# Patient Record
Sex: FEMALE | Race: WHITE | URBAN - METROPOLITAN AREA
[De-identification: names, ages, dates, MRNs, and addresses within clinical notes are randomized per-mention and may not be internally consistent; named-entity substitution may affect disease eponyms.]

---

## 2018-05-12 ENCOUNTER — HOSPITAL ENCOUNTER (EMERGENCY)
Facility: CLINIC | Age: 23
Discharge: HOME | End: 2018-05-13
Attending: EMERGENCY MEDICINE | Admitting: EMERGENCY MEDICINE
Payer: COMMERCIAL

## 2018-05-12 DIAGNOSIS — R55 VASOVAGAL NEAR SYNCOPE: ICD-10-CM

## 2018-05-12 DIAGNOSIS — T78.40XA ALLERGIC REACTION, INITIAL ENCOUNTER: ICD-10-CM

## 2018-05-12 PROCEDURE — 99284 EMERGENCY DEPT VISIT MOD MDM: CPT | Mod: 25 | Performed by: EMERGENCY MEDICINE

## 2018-05-12 PROCEDURE — 99283 EMERGENCY DEPT VISIT LOW MDM: CPT | Performed by: EMERGENCY MEDICINE

## 2018-05-12 PROCEDURE — 93005 ELECTROCARDIOGRAM TRACING: CPT | Performed by: EMERGENCY MEDICINE

## 2018-05-12 PROCEDURE — 93010 ELECTROCARDIOGRAM REPORT: CPT | Mod: Z6 | Performed by: EMERGENCY MEDICINE

## 2018-05-12 RX ORDER — DIPHENHYDRAMINE HCL 25 MG
25 CAPSULE ORAL ONCE
Status: COMPLETED | OUTPATIENT
Start: 2018-05-12 | End: 2018-05-13

## 2018-05-12 RX ORDER — ALBUTEROL SULFATE 0.83 MG/ML
1 SOLUTION RESPIRATORY (INHALATION) EVERY 6 HOURS PRN
COMMUNITY
End: 2018-05-21

## 2018-05-12 ASSESSMENT — ENCOUNTER SYMPTOMS
FACIAL SWELLING: 1
TROUBLE SWALLOWING: 1
SHORTNESS OF BREATH: 0
VOMITING: 0
ABDOMINAL PAIN: 1
FEVER: 0
DYSURIA: 0
DIZZINESS: 1
NAUSEA: 0
COUGH: 0
LIGHT-HEADEDNESS: 1
DIARRHEA: 0
HEMATURIA: 0

## 2018-05-12 NOTE — ED AVS SNAPSHOT
[facility], [place], Emergency Department    [address] [place] [address]    Phone:  [phone]                                       Seema Henao   MRN: [id number]    Department:  [facility], Emergency Department   Date of Visit:  5/12/2018           After Visit Summary Signature Page     I have received my discharge instructions, and my questions have been answered. I have discussed any challenges I see with this plan with the nurse or doctor.    ..........................................................................................................................................  Patient/Patient Representative Signature      ..........................................................................................................................................  Patient Representative Print Name and Relationship to Patient    ..................................................               ................................................  Date                                            Time    ..........................................................................................................................................  Reviewed by Signature/Title    ...................................................              ..............................................  Date                                                            Time

## 2018-05-12 NOTE — ED AVS SNAPSHOT
[facility], Emergency Department    [address] [address]    Phone:  [phone]                                       Seema Henao   MRN: [id number]    Department:  [facility], Emergency Department   Date of Visit:  5/12/2018           Patient Information     Date Of Birth          1995        Your diagnoses for this visit were:     Vasovagal syncope     Allergic reaction, initial encounter        You were seen by Yolanda Mendez MD.        Discharge Instructions       Thank you for coming to the [facility] Emergency Department.     Use benadryl 25-50mg every 6 hours as needed for itching, hives or swelling.     Return to the ER immediately for trouble breathing, swelling of the tongue or throat.     Please follow up with your primary care provider with any ongoing concerns. Referral to an allergist if any additional allergic reaction symptoms occur.         24 Hour Appointment Hotline       To make an appointment at any [facility] clinic, call [phone]). If you don't have a family doctor or clinic, we will help you find one. [facility] clinics are conveniently located to serve the needs of you and your family.             Review of your medicines      START taking        Dose / Directions Last dose taken    diphenhydrAMINE 25 MG capsule   Commonly known as:  BENADRYL   Dose:  25 mg   Quantity:  20 capsule        Take 1 capsule (25 mg) by mouth every 6 hours as needed for itching or allergies   Refills:  0          Our records show that you are taking the medicines listed below. If these are incorrect, please call your family doctor or clinic.        Dose / Directions Last dose taken    albuterol (2.5 MG/3ML) 0.083% neb solution   Dose:  1 vial        Take 1 vial by nebulization every 6 hours as needed for shortness of breath / dyspnea or wheezing   Refills:  0        SERTRALINE HCL PO        Take by mouth daily   Refills:  0                 Prescriptions were sent or printed at these locations (1 Prescription)                   Other Prescriptions                Printed at Department/Unit printer (1 of 1)         diphenhydrAMINE (BENADRYL) 25 MG capsule                Procedures and tests performed during your visit     EKG 12 lead      Orders Needing Specimen Collection     None      Pending Results     Date and Time Order Name Status Description    5/12/2018 2342 EKG 12 lead Preliminary             Pending Culture Results     No orders found for last 3 day(s).            Pending Results Instructions     If you had any lab results that were not finalized at the time of your Discharge, you can call the ED Lab Result RN at [phone]. You will be contacted by this team for any positive Lab results or changes in treatment. The nurses are available 7 days a week from 10A to 6:30P.  You can leave a message 24 hours per day and they will return your call.        Thank you for choosing [facility]       Thank you for choosing [facility] for your care. Our goal is always to provide you with excellent care. Hearing back from our patients is one way we can continue to improve our services. Please take a few minutes to complete the written survey that you may receive in the mail after you visit with us. Thank you!        Care EveryWhere ID     This is your Care EveryWhere ID. This could be used by other organizations to access your [facility] medical records  [id number]        Equal Access to Services     MONIKA MANJARREZ : Adriano Rizzo, waomerda christiano, qaybta kaalmada catracho, jazmín guerrero. So [facility] [phone].    ATENCIÓN: Si habla español, tiene a bolton disposición servicios gratuitos de asistencia lingüística. Llame al [phone].    We comply with applicable federal civil rights laws and [place] laws. We do not discriminate on the basis of race, color, national origin, age, disability, sex, sexual  orientation, or gender identity.            After Visit Summary       This is your record. Keep this with you and show to your community pharmacist(s) and doctor(s) at your next visit.

## 2018-05-13 VITALS
SYSTOLIC BLOOD PRESSURE: 103 MMHG | OXYGEN SATURATION: 100 % | DIASTOLIC BLOOD PRESSURE: 73 MMHG | TEMPERATURE: 97.9 F | HEART RATE: 75 BPM

## 2018-05-13 PROCEDURE — 25000132 ZZH RX MED GY IP 250 OP 250 PS 637: Performed by: EMERGENCY MEDICINE

## 2018-05-13 RX ORDER — DIPHENHYDRAMINE HCL 25 MG
25 CAPSULE ORAL EVERY 6 HOURS PRN
Qty: 20 CAPSULE | Refills: 0 | Status: SHIPPED | OUTPATIENT
Start: 2018-05-13 | End: 2018-05-21

## 2018-05-13 RX ADMIN — DIPHENHYDRAMINE HYDROCHLORIDE 25 MG: 25 CAPSULE ORAL at 00:24

## 2018-05-13 RX ADMIN — RANITIDINE 150 MG: 150 TABLET ORAL at 00:41

## 2018-05-13 NOTE — DISCHARGE INSTRUCTIONS
Thank you for coming to the [facility] Emergency Department.     Use benadryl 25-50mg every 6 hours as needed for itching, hives or swelling.     Return to the ER immediately for trouble breathing, swelling of the tongue or throat.     Please follow up with your primary care provider with any ongoing concerns. Referral to an allergist if any additional allergic reaction symptoms occur.

## 2018-05-13 NOTE — ED PROVIDER NOTES
History     Chief Complaint   Patient presents with     Allergic Reaction     HPI  Seema Henao is an otherwise healthy 23 year old female with history of asthma who presents to the emergency department for the evaluation of a potential allergic reaction. The patient reports that she was at a tasting event this evening when she developed intraoral swelling as well as lip swelling/tingling. The patient then attempted to leave the restaurant and obtain Benadryl, however, she became lightheaded and diaphoretic with associated heart burn when attempting to do so, and EMS was called.  The patient reports that all this happened at around 2210, but 1.5 hours prior to arrival.  She states that she does have a Orient allergy, but it has never provoked symptoms as severe as tonight.  She denies any other known allergies. She was not able to take Benadryl prior to arrival. The patient was furnished with a list of ingredients used within the food present at the tasting tonight, and she believes that her symptoms were secondary to whitefish consumption as she does not typically ingest fish.  Currently, the patient reports that her symptoms have largely resolved.  She denies any rash or other medical problems.  She reports that she had seen an allergist when she was younger, however, she has not seen one recently      PAST MEDICAL HISTORY: healthy   PAST SURGICAL HISTORY: No past surgical history on file.    FAMILY HISTORY: No family history on file.    SOCIAL HISTORY:   No tobacco or alcohol use       Allergies   Allergen Reactions     Walnuts [Nuts]          I have reviewed the Medications, Allergies, Past Medical and Surgical History, and Social History in the Epic system.    Review of Systems   Constitutional: Negative for fever.   HENT: Positive for facial swelling (lower lip ) and trouble swallowing (2/2 swelling). Negative for congestion and drooling.    Respiratory: Negative for cough and shortness of breath.     Cardiovascular: Negative for chest pain.   Gastrointestinal: Positive for abdominal pain (Heartburn). Negative for diarrhea, nausea and vomiting.   Genitourinary: Negative for dysuria and hematuria.   Neurological: Positive for dizziness and light-headedness.   All other systems reviewed and are negative.      Physical Exam   BP: 109/78  Pulse: 72  Temp: 97.9  F (36.6  C)  SpO2: 99 %      Physical Exam   Gen:A&Ox3, no acute distress, normal speech  HEENT:PERRL, no facial tenderness or wounds, head atraumatic, oropharynx clear, mucous membranes moist, TMs clear bilaterally. No edema to lips, tongue, soft palate.   Neck:no bony tenderness or step offs, no JVD, trachea midline, no swelling  Back: no CVA tenderness, no midline bony tenderness  CV:RRR without murmurs  PULM:Clear to auscultation bilaterally  Abd:soft, nontender, nondistended. Bowel sounds present and normal  UE:No traumatic injuries, skin normal  LE:no traumatic injuries, skin normal, no LE edema.   Neuro:CN II-XII intact, strength 5/5 throughout, gait stable.   Skin: no rashes or ecchymoses      ED Course     ED Course     Procedures             EKG Interpretation:      Interpreted by Yolanda Mendez  Time reviewed: 11:50PM  Symptoms at time of EKG: near syncope  Rhythm: normal sinus   Rate: 74  Axis: normal  Ectopy: none  Conduction: normal  ST Segments/ T Waves: No ST-T wave changes  Q Waves: none  Comparison to prior: No old EKG available    Clinical Impression: normal EKG          Critical Care time:  none    Assessments & Plan (with Medical Decision Making)   24 yo F presenting with possible allergic reaction.  Symptoms now improved.   Associated with lightheadedness, with description suspicious for vasovagal reaction.   EKG unremarkable.   Monitored on tele and no arrhythmias noted.   Treated with 15mg PO benadryl and 150mg PO ranitidine.   Monitored in the ED for 2 hours and exam repeated 3 times. Complete resolution of lip swelling, no soft  palate swelling, or other signs of anaphylaxis.   Benadryl PRN.   Follow up with primary care and allergist as needed.   Return instructions reviewed.     I have reviewed the nursing notes.    I have reviewed the findings, diagnosis, plan and need for follow up with the patient.    Discharge Medication List as of 5/13/2018 12:52 AM      START taking these medications    Details   diphenhydrAMINE (BENADRYL) 25 MG capsule Take 1 capsule (25 mg) by mouth every 6 hours as needed for itching or allergies, Disp-20 capsule, R-0, Local Print             Final diagnoses:   Vasovagal syncope   Allergic reaction, initial encounter   I, Bandar Linder, am serving as a trained medical scribe to document services personally performed by Yolanda Mendez MD, based on the provider's statements to me.      I, Yolanda Mendez MD, was physically present and have reviewed and verified the accuracy of this note documented by Bandar Linder.       5/12/2018   [facility], [place], EMERGENCY DEPARTMENT    MD ZACKERY Hadley Katrina Anne, MD  05/13/18 0123

## 2018-05-13 NOTE — ED TRIAGE NOTES
Pt was at a restaurant and she started feeling like her throat was closing, her tongue was swelling, and she felt clammy and faint. She feels a lot better now. She still feels shaky. Still some burning in her stomach, feels like acid reflux. She has a condition where she feels like she is going to faint when she gets an IV. No medicine from EMS besides oxygen. bp 99/63, 62 heart rate, 985 on RA

## 2018-05-14 LAB — INTERPRETATION ECG - MUSE: NORMAL

## 2018-05-21 ENCOUNTER — OFFICE VISIT (OUTPATIENT)
Dept: INTERNAL MEDICINE | Facility: CLINIC | Age: 23
End: 2018-05-21
Payer: COMMERCIAL

## 2018-05-21 VITALS
SYSTOLIC BLOOD PRESSURE: 108 MMHG | OXYGEN SATURATION: 98 % | HEART RATE: 73 BPM | WEIGHT: 145.1 LBS | DIASTOLIC BLOOD PRESSURE: 69 MMHG

## 2018-05-21 DIAGNOSIS — T78.40XD ALLERGIC REACTION, SUBSEQUENT ENCOUNTER: ICD-10-CM

## 2018-05-21 DIAGNOSIS — T78.40XD ALLERGIC REACTION, SUBSEQUENT ENCOUNTER: Primary | ICD-10-CM

## 2018-05-21 LAB
BASOPHILS # BLD AUTO: 0.1 10E9/L (ref 0–0.2)
BASOPHILS NFR BLD AUTO: 0.5 %
DIFFERENTIAL METHOD BLD: NORMAL
EOSINOPHIL # BLD AUTO: 0.3 10E9/L (ref 0–0.7)
EOSINOPHIL NFR BLD AUTO: 2.8 %
ERYTHROCYTE [DISTWIDTH] IN BLOOD BY AUTOMATED COUNT: 12.7 % (ref 10–15)
HCT VFR BLD AUTO: 42.1 % (ref 35–47)
HGB BLD-MCNC: 14.1 G/DL (ref 11.7–15.7)
IMM GRANULOCYTES # BLD: 0 10E9/L (ref 0–0.4)
IMM GRANULOCYTES NFR BLD: 0.3 %
LYMPHOCYTES # BLD AUTO: 3.3 10E9/L (ref 0.8–5.3)
LYMPHOCYTES NFR BLD AUTO: 35 %
MCH RBC QN AUTO: 29.8 PG (ref 26.5–33)
MCHC RBC AUTO-ENTMCNC: 33.5 G/DL (ref 31.5–36.5)
MCV RBC AUTO: 89 FL (ref 78–100)
MONOCYTES # BLD AUTO: 0.7 10E9/L (ref 0–1.3)
MONOCYTES NFR BLD AUTO: 7.3 %
NEUTROPHILS # BLD AUTO: 5.1 10E9/L (ref 1.6–8.3)
NEUTROPHILS NFR BLD AUTO: 54.1 %
NRBC # BLD AUTO: 0 10*3/UL
NRBC BLD AUTO-RTO: 0 /100
PLATELET # BLD AUTO: 263 10E9/L (ref 150–450)
PLATELET # BLD EST: NORMAL 10*3/UL
RBC # BLD AUTO: 4.73 10E12/L (ref 3.8–5.2)
WBC # BLD AUTO: 9.4 10E9/L (ref 4–11)

## 2018-05-21 RX ORDER — EPINEPHRINE 0.3 MG/.3ML
INJECTION SUBCUTANEOUS
Qty: 0.6 ML | Refills: 1 | Status: SHIPPED | OUTPATIENT
Start: 2018-05-21

## 2018-05-21 ASSESSMENT — PAIN SCALES - GENERAL: PAINLEVEL: NO PAIN (0)

## 2018-05-21 NOTE — PROGRESS NOTES
[facility]  Primary Care Center   Lynn Huang MD  05/21/2018      Chief Complaint:   No chief complaint on file.       History of Present Illness:   Seema Henao is a 23 year old female with a history of *** who presents for evaluation of ***.    Other concerns discussed:  ***     Review of Systems:   Pertinent items are noted in HPI, remainder of complete ROS is negative.      Active Medications:      albuterol (2.5 MG/3ML) 0.083% neb solution, Take 1 vial by nebulization every 6 hours as needed for shortness of breath / dyspnea or wheezing, Disp: , Rfl:      diphenhydrAMINE (BENADRYL) 25 MG capsule, Take 1 capsule (25 mg) by mouth every 6 hours as needed for itching or allergies, Disp: 20 capsule, Rfl: 0     SERTRALINE HCL PO, Take by mouth daily, Disp: , Rfl:       Allergies:   Walnuts [nuts]      Past Medical History:  Food allergy  Asthma      Past Surgical History:  History reviewed. No pertinent past surgical history.     Family History:   History reviewed. No pertinent family history.       Social History:   Marital Status: single  Tobacco Use: No previous or current tobacco use.  Alcohol Use: No alcohol use.   PCP: [facility]      Physical Exam:   There were no vitals taken for this visit.   ***      Assessment and Plan:  There are no diagnoses linked to this encounter.        Follow-up: Data Unavailable         Scribe Disclosure:   I, Nina Coughlin, am serving as a scribe to document services personally performed by Lynn Huang MD at this visit, based upon the provider's statements to me. All documentation has been reviewed by the aforementioned provider prior to being entered into the official medical record.     Portions of this medical record were completed by a scribe. UPON MY REVIEW AND AUTHENTICATION BY ELECTRONIC SIGNATURE, this confirms (a) I performed the applicable clinical services, and (b) the record is accurate.

## 2018-05-21 NOTE — PATIENT INSTRUCTIONS
[facility]: [phone]     [facility] Medication Refill Request Information:  * Please contact your pharmacy regarding ANY request for medication refills.  ** [facility] Prescription Fax = [phone]  * Please allow 3 business days for routine medication refills.  * Please allow 5 business days for controlled substance medication refills.     [facility] Test Result notification information:  *You will be notified with in 7-10 days of your appointment day regarding the results of your test.  If you are on MyChart you will be notified as soon as the provider has reviewed the results and signed off on them.

## 2018-05-21 NOTE — NURSING NOTE
Chief Complaint   Patient presents with     Allergies     Pt is here to discuss food allergy.     Talya Toscano LPN at 3:59 PM on 5/21/2018.

## 2018-05-21 NOTE — PROGRESS NOTES
[facility]  Primary Care Center   Lynn Huang MD  05/21/2018      Chief Complaint:   Allergies       History of Present Illness:   Seema Henao is a 23 year old female with a history of asthma and seasonal allergies who presents for evaluation of food allergy. We discussed the following concerns:    On 5/12/18, the patient was at a food tasting. She has an allergy to walnuts, which results in a mild itching in the mouth. She does not have an epi pen. She ate multiple samples of dishes, but believes the white fish dish was the culprit as the reaction occurred shortly after trying it and she does not eat white fish but regularly eats the ingredients in all the other dishes. A full list of ingredients was provided by the [facility] and there were no walnuts used and no walnuts in the kitchen. She has eaten tuna, lobster, and crab recently without issue. In general, she does not enjoy seafood and does not eat it often.     The allergic reaction was lip/throat/mouth burning and swelling/closing of her throat and swelling of the lower lip. She did have difficulty breathing and when she stood up from the chair, she became faint/lightheaded/clammy and saw dots but did not lose consciousness. EMS was called. She was told that she had low BP in the ambulance. Her symptoms improved while in the ambulance without any therapies. Received PO Benadryl and Zantac (had some burning in the epigastrium) in the ED. Had some diarrhea but no abdominal pain, hives, or rash.    Saw an allergist in the past as a child for prick testing, which showed seasonal allergies.    Just graduated from the  with majors in marketing and [employer]. She follows with a nurse practitioner at [facility] that specializes in mental health and women's health. She is up to date on her pap smear, HPV shot, and tetanus shot.      Review of Systems:   Pertinent positives and negatives are noted in HPI.      Active Medications:     Current  Outpatient Prescriptions:      Levalbuterol HCl (XOPENEX IN), Inhale into the lungs as needed, Disp: , Rfl:      SERTRALINE HCL PO, Take 75 mg by mouth daily , Disp: , Rfl:       Allergies:   Walnuts [nuts]      Past Medical History:  No past medical history on file.  There is no problem list on file for this patient.       Past Surgical History:  No past surgical history on file.    Family History:   No family history on file.      Social History:   Social History   Substance Use Topics     Smoking status: Not on file     Smokeless tobacco: Not on file     Alcohol use Not on file        Physical Exam:   /69  Pulse 73  Wt 65.8 kg (145 lb 1.6 oz)  SpO2 98%   Constitutional: no distress, comfortable, pleasant   Head: no signs of trauma  Eyes: anicteric, pupils equal, normal extra-ocular movements  Cardiovascular: regular rate and rhythm, normal S1 and S2, no murmurs, rubs or gallops  Respiratory: clear to auscultation, no wheezes or crackles, normal breath sounds   Gastrointestinal: nontender, no hepatosplenomegaly, no masses   Musculoskeletal: moves all extremities, no deformities noted  Skin: no concerning lesions, no jaundice   Neurological: normal speech, normal gait, no tremor   Psychological: appropriate mood and affect    Assessment and Plan:  Allergic reaction, subsequent encounter  Patient referred to allergy for further testing and diagnosis. Epi pen provided for use in emergency. Can also get Benadryl OTC. Will obtain CBC to assess for eosinophilia.   - ALLERGY/ASTHMA ADULT REFERRAL  - EPINEPHrine (EPIPEN/ADRENACLICK/OR ANY BX GENERIC EQUIV) 0.3 MG/0.3ML injection 2-pack  Dispense: 0.6 mL; Refill: 1  - CBC with platelets differential       Follow-up: As needed, patient receives ethan primary care at [facility].         Monique Sidhu, MS4, serving as a scribe for Lynn Huang MD    The medical student acted as scribe and the encounter documented above was completely performed by myself.  Supervising  Doctor Lynn Huang MD

## 2018-05-21 NOTE — MR AVS SNAPSHOT
After Visit Summary   5/21/2018    Seema Henao    MRN: [id number]           Patient Information     Date Of Birth          1995        Visit Information        Provider Department      5/21/2018 4:10 PM Lynn Huang MD [facility]        Today's Diagnoses     Allergic reaction, subsequent encounter    -  1      Care Instructions    [facility]: [phone]     [facility] Medication Refill Request Information:  * Please contact your pharmacy regarding ANY request for medication refills.  ** [facility] Prescription Fax = [phone]  * Please allow 3 business days for routine medication refills.  * Please allow 5 business days for controlled substance medication refills.     Primary Care Center Test Result notification information:  *You will be notified with in 7-10 days of your appointment day regarding the results of your test.  If you are on MyChart you will be notified as soon as the provider has reviewed the results and signed off on them.            Follow-ups after your visit        Additional Services     ALLERGY/ASTHMA ADULT REFERRAL       Your provider has referred you to: [facility] - [phone]  [email or web address]    Please be aware that coverage of these services is subject to the terms and limitations of your health insurance plan.  Call member services at your health plan with any benefit or coverage questions.      Please bring the following with you to your appointment:    (1) Any X-Rays, CTs or MRIs which have been performed.  Contact the facility where they were done to arrange for  prior to your scheduled appointment.    (2) List of current medications  (3) This referral request   (4) Any documents/labs given to you for this referral                  Your next 10 appointments already scheduled     Aug 27, 2018  2:15 PM CDT   (Arrive by 2:00 PM)   New Allergy  with Michael Fields MD   [facility] ([facility])    [address]  [address] [place] [id number] [phone]           Do not take anti-histamines or Zantac for seven day prior to your appointment.              Future tests that were ordered for you today     Open Future Orders        Priority Expected Expires Ordered    CBC with platelets differential Routine 5/21/2018 6/4/2018 5/21/2018            Who to contact     Please call your clinic at [phone] to:    Ask questions about your health    Make or cancel appointments    Discuss your medicines    Learn about your test results    Speak to your doctor            Additional Information About Your Visit        Care EveryWhere ID     This is your Care EveryWhere ID. This could be used by other organizations to access your [facility] medical records  [id number]        Your Vitals Were     Pulse Pulse Oximetry                73 98%           Blood Pressure from Last 3 Encounters:   05/21/18 108/69   05/13/18 103/73    Weight from Last 3 Encounters:   05/21/18 65.8 kg (145 lb 1.6 oz)              We Performed the Following     ALLERGY/ASTHMA ADULT REFERRAL          Today's Medication Changes          These changes are accurate as of 5/21/18  5:27 PM.  If you have any questions, ask your nurse or doctor.               Start taking these medicines.        Dose/Directions    EPINEPHrine 0.3 MG/0.3ML injection 2-pack   Commonly known as:  EPIPEN/ADRENACLICK/or ANY BX GENERIC EQUIV   Used for:  Allergic reaction, subsequent encounter        Use 1 epi pen for lip swelling, difficulty breathing, or wheezing.   Quantity:  0.6 mL   Refills:  1         Stop taking these medicines if you haven't already. Please contact your care team if you have questions.     albuterol (2.5 MG/3ML) 0.083% neb solution           diphenhydrAMINE 25 MG capsule   Commonly known as:  BENADRYL                Where  to get your medicines      These medications were sent to [place], [place] - [address]  [address], [facility] [address]    Hours:  TRANSPLANT PHONE NUMBER [phone] Phone:  [phone]     EPINEPHrine 0.3 MG/0.3ML injection 2-pack                Primary Care Provider Fax #    [facility] [phone]       [address] [place] [address]        Equal Access to Services     MONIKA MANJARREZ : Hadii aad ku hadasho Soomaali, waaxda luqadaha, qaybta kaalmada adeegyada, waxay idiin hayaan adeeg kharash la'mckenzie guerrero. [facility] [phone].    ATENCIÓN: Si habla español, tiene a bolton disposición servicios gratuitos de asistencia lingüística. [facility] [phone].    We comply with applicable federal civil rights laws and [place] laws. We do not discriminate on the basis of race, color, national origin, age, disability, sex, sexual orientation, or gender identity.            Thank you!     Thank you for choosing [facility]  for your care. Our goal is always to provide you with excellent care. Hearing back from our patients is one way we can continue to improve our services. Please take a few minutes to complete the written survey that you may receive in the mail after your visit with us. Thank you!             Your Updated Medication List - Protect others around you: Learn how to safely use, store and throw away your medicines at www.disposemymeds.org.          This list is accurate as of 5/21/18  5:27 PM.  Always use your most recent med list.                   Brand Name Dispense Instructions for use Diagnosis    EPINEPHrine 0.3 MG/0.3ML injection 2-pack    EPIPEN/ADRENACLICK/or ANY BX GENERIC EQUIV    0.6 mL    Use 1 epi pen for lip swelling, difficulty breathing, or wheezing.    Allergic reaction, subsequent encounter       SERTRALINE HCL PO      Take 75 mg by mouth daily        XOPENEX IN      Inhale into the  lungs as needed

## 2018-08-10 NOTE — TELEPHONE ENCOUNTER
FUTURE VISIT INFORMATION      FUTURE VISIT INFORMATION:    Date: 8.27.18    Time: 2:15 PM    [facility]  REFERRAL INFORMATION:    Referring provider:  Dr. Pearson    Referring providers clinic:  [facility]    Reason for visit/diagnosis  Allergic reaction    RECORDS REQUESTED FROM:       Clinic name Comments Records Status Imaging Status   PCC Referral placed 5.21.18 EPIC                                    RECORDS STATUS      RECORDS RECEIVED FROM: [facility] and [facility]   DATE RECEIVED: 8.27.18   NOTES STATUS DETAILS   OFFICE NOTE from referring provider Internal 5.21.18 Referral and OV, PCC   OFFICE NOTE from other specialist N/A    DISCHARGE SUMMARY from hospital N/A    DISCHARGE REPORT from the ER Internal 5.12.18 [facility]   OPERATIVE REPORT N/A    MEDICATION LIST Internal    IMAGING  (NEED IMAGES AND REPORTS)     CT SCAN N/A    CHEST XRAY (CXR) N/A    TESTS     PULMONARY FUNCTION TESTING (PFT) N/A    FLOW LOOP VOLUME (FVL) N/A    CYSTIC FIBROSIS     CF SPUTUM CULTURE N/A

## 2018-08-27 ENCOUNTER — PRE VISIT (OUTPATIENT)
Dept: PULMONOLOGY | Facility: CLINIC | Age: 23
End: 2018-08-27

## 2018-08-27 ENCOUNTER — OFFICE VISIT (OUTPATIENT)
Dept: PULMONOLOGY | Facility: CLINIC | Age: 23
End: 2018-08-27
Attending: ALLERGY & IMMUNOLOGY
Payer: COMMERCIAL

## 2018-08-27 VITALS
SYSTOLIC BLOOD PRESSURE: 104 MMHG | BODY MASS INDEX: 22.5 KG/M2 | HEART RATE: 85 BPM | DIASTOLIC BLOOD PRESSURE: 70 MMHG | RESPIRATION RATE: 16 BRPM | HEIGHT: 66 IN | WEIGHT: 140 LBS | OXYGEN SATURATION: 98 %

## 2018-08-27 DIAGNOSIS — T78.40XD ALLERGIC REACTION, SUBSEQUENT ENCOUNTER: ICD-10-CM

## 2018-08-27 DIAGNOSIS — J30.1 CHRONIC SEASONAL ALLERGIC RHINITIS DUE TO POLLEN: ICD-10-CM

## 2018-08-27 DIAGNOSIS — Z91.013 SEAFOOD ALLERGY: Primary | ICD-10-CM

## 2018-08-27 PROCEDURE — G0463 HOSPITAL OUTPT CLINIC VISIT: HCPCS | Mod: 25,ZF

## 2018-08-27 PROCEDURE — 95004 PERQ TESTS W/ALRGNC XTRCS: CPT | Performed by: ALLERGY & IMMUNOLOGY

## 2018-08-27 PROCEDURE — G0463 HOSPITAL OUTPT CLINIC VISIT: HCPCS

## 2018-08-27 ASSESSMENT — PAIN SCALES - GENERAL: PAINLEVEL: NO PAIN (0)

## 2018-08-27 NOTE — PROGRESS NOTES
Reason for Visit  Seema Henao is a 23 year old female who is referred by Service, [facility] for allergic reaction    Allergy HPI    This is the note from PCP: 'On 5/12/18, the patient was at a food tasting. She has an allergy to walnuts, which results in a mild itching in the mouth. She does not have an epi pen. She ate multiple samples of dishes, but believes the white fish dish was the culprit as the reaction occurred shortly after trying it and she does not eat white fish but regularly eats the ingredients in all the other dishes. A full list of ingredients was provided by the [facility] and there were no walnuts used and no walnuts in the kitchen. She has eaten tuna, lobster, and crab recently without issue. In general, she does not enjoy seafood and does not eat it often.      The allergic reaction was lip/throat/mouth burning and swelling/closing of her throat and swelling of the lower lip. She did have difficulty breathing and when she stood up from the chair, she became faint/lightheaded/clammy and saw dots but did not lose consciousness. EMS was called. She was told that she had low BP in the ambulance. Her symptoms improved while in the ambulance without any therapies. Received PO Benadryl and Zantac (had some burning in the epigastrium) in the ED. Had some diarrhea but no abdominal pain, hives, or rash.'    No reactions since then.  Other nuts are fine, apples cherries carrots never cause problems.    Menu had kwi, kohlrabi, cucumber, chicken liver, lamb, beets, asparagus, celeriac, quail, whitefish, duck.  Also drank some wine with this.   She was otherwise feeling well.  No aspirin or other new meds at the time.     Spring and fall she gets runny nose, red watery eyes and occasional sinus infection an uses nasocort and no allergy pills.  nasocort keeps things under control.  She has asthma and uses Xopenex prn and mostly before exercise.  No frequent need otherwise.    Social:  No pets, no  "smoking    Family hx:  Mom with seasonal allergies.         The patient was seen and examined by Michael Keys MD   Current Outpatient Prescriptions   Medication     EPINEPHrine (EPIPEN/ADRENACLICK/OR ANY BX GENERIC EQUIV) 0.3 MG/0.3ML injection 2-pack     Levalbuterol HCl (XOPENEX IN)     SERTRALINE HCL PO     No current facility-administered medications for this visit.      Allergies   Allergen Reactions     Prednisone      hallucinations     Walnuts [Nuts]      Social History     Social History     Marital status: Single     Spouse name: N/A     Number of children: N/A     Years of education: N/A     Occupational History     Not on file.     Social History Main Topics     Smoking status: Never Smoker     Smokeless tobacco: Never Used     Alcohol use Yes      Comment: couple/week     Drug use: No     Sexual activity: Not on file     Other Topics Concern     Not on file     Social History Narrative     No narrative on file     History reviewed. No pertinent past medical history.  History reviewed. No pertinent surgical history.  History reviewed. No pertinent family history.      ROS   A complete ROS was otherwise negative except as noted in the HPI and the end of the note.  /70  Pulse 85  Resp 16  Ht 1.676 m (5' 6\")  Wt 63.5 kg (140 lb)  SpO2 98%  BMI 22.6 kg/m2  Exam:   GENERAL APPEARANCE: Well developed, well nourished, alert, and in no apparent distress.  EYES: PERRL, EOMI, conjunctiva clear non-injected  HENT: Nasal mucosa with moderate edema on the left and scant clear discharge. No nasal polyps.    EARS: Canals clear, TMs normal  MOUTH: Oral mucosa is moist, without any lesions, no tonsillar enlargement, no oropharyngeal exudate.  RESP: Good air flow throughout.  No crackles. No rhonchi. No wheezes.  CV: Normal S1, S2, regular rhythm, normal rate. No murmur.  No rub. No gallop. No LE edema.   MS: Extremities normal. No clubbing. No cyanosis.  SKIN: No rashes noted  NEURO: Speech " normal, normal strength and tone, normal gait and stance  PSYCH: Normal mentation, orientation to person, place, and time.  Results: 11 percutaneous food and environmental allergen (and 2 controls) extracts placed by MA and read by MD.  Catfish, cod and perch all positive.  Hazelnut mild positive, grass, mugwort weed and birch tree pollen positive.          Assessment and plan: I did look over the extensive menu and ingredients of the tasting menu she ate.  IgE mediated skin testing shows she is positive to finned fish and needs to avoid. Oyster is negative so mollusks should be OK, she also tolerates shellfish.  She has an epi device.  We also accessed to pollens and things that cross react in oral allergy syndrome.  She did show a very mild positive to hazelnut and this is most likely oral allergy.

## 2018-08-27 NOTE — LETTER
8/27/2018       RE: Seema Henao  [address]  [facility] [address]     Dear Colleague,    Thank you for referring your patient, Seema Henao, to the [facility] at [facility]. Please see a copy of my visit note below.    Reason for Visit  Seema Henao is a 23 year old female who is referred by Service, [facility] for allergic reaction    Allergy HPI    This is the note from PCP: 'On 5/12/18, the patient was at a food tasting. She has an allergy to walnuts, which results in a mild itching in the mouth. She does not have an epi pen. She ate multiple samples of dishes, but believes the white fish dish was the culprit as the reaction occurred shortly after trying it and she does not eat white fish but regularly eats the ingredients in all the other dishes. A full list of ingredients was provided by the [facility] and there were no walnuts used and no walnuts in the kitchen. She has eaten tuna, lobster, and crab recently without issue. In general, she does not enjoy seafood and does not eat it often.      The allergic reaction was lip/throat/mouth burning and swelling/closing of her throat and swelling of the lower lip. She did have difficulty breathing and when she stood up from the chair, she became faint/lightheaded/clammy and saw dots but did not lose consciousness. EMS was called. She was told that she had low BP in the ambulance. Her symptoms improved while in the ambulance without any therapies. Received PO Benadryl and Zantac (had some burning in the epigastrium) in the ED. Had some diarrhea but no abdominal pain, hives, or rash.'    No reactions since then.  Other nuts are fine, apples cherries carrots never cause problems.    Menu had kwi, kohlrabi, cucumber, chicken liver, lamb, beets, asparagus, celeriac, quail, whitefish, duck.  Also drank some wine with this.   She was otherwise feeling well.  No aspirin or other  "new meds at the time.     Spring and fall she gets runny nose, red watery eyes and occasional sinus infection an uses nasocort and no allergy pills.  nasocort keeps things under control.  She has asthma and uses Xopenex prn and mostly before exercise.  No frequent need otherwise.    Social:  No pets, no smoking    Family hx:  Mom with seasonal allergies.         The patient was seen and examined by Michael Keys MD   Current Outpatient Prescriptions   Medication     EPINEPHrine (EPIPEN/ADRENACLICK/OR ANY BX GENERIC EQUIV) 0.3 MG/0.3ML injection 2-pack     Levalbuterol HCl (XOPENEX IN)     SERTRALINE HCL PO     No current facility-administered medications for this visit.      Allergies   Allergen Reactions     Prednisone      hallucinations     Walnuts [Nuts]      Social History     Social History     Marital status: Single     Spouse name: N/A     Number of children: N/A     Years of education: N/A     Occupational History     Not on file.     Social History Main Topics     Smoking status: Never Smoker     Smokeless tobacco: Never Used     Alcohol use Yes      Comment: couple/week     Drug use: No     Sexual activity: Not on file     Other Topics Concern     Not on file     Social History Narrative     No narrative on file     History reviewed. No pertinent past medical history.  History reviewed. No pertinent surgical history.  History reviewed. No pertinent family history.      ROS   A complete ROS was otherwise negative except as noted in the HPI and the end of the note.  /70  Pulse 85  Resp 16  Ht 1.676 m (5' 6\")  Wt 63.5 kg (140 lb)  SpO2 98%  BMI 22.6 kg/m2  Exam:   GENERAL APPEARANCE: Well developed, well nourished, alert, and in no apparent distress.  EYES: PERRL, EOMI, conjunctiva clear non-injected  HENT: Nasal mucosa with moderate edema on the left and scant clear discharge. No nasal polyps.    EARS: Canals clear, TMs normal  MOUTH: Oral mucosa is moist, without any lesions, no " tonsillar enlargement, no oropharyngeal exudate.  RESP: Good air flow throughout.  No crackles. No rhonchi. No wheezes.  CV: Normal S1, S2, regular rhythm, normal rate. No murmur.  No rub. No gallop. No LE edema.   MS: Extremities normal. No clubbing. No cyanosis.  SKIN: No rashes noted  NEURO: Speech normal, normal strength and tone, normal gait and stance  PSYCH: Normal mentation, orientation to person, place, and time.  Results: 11 percutaneous food and environmental allergen (and 2 controls) extracts placed by MA and read by MD.  Catfish, cod and perch all positive.  Hazelnut mild positive, grass, mugwort weed and birch tree pollen positive.          Assessment and plan: I did look over the extensive menu and ingredients of the tasting menu she ate.  IgE mediated skin testing shows she is positive to finned fish and needs to avoid. Oyster is negative so mollusks should be OK, she also tolerates shellfish.  She has an epi device.  We also accessed to pollens and things that cross react in oral allergy syndrome.  She did show a very mild positive to hazelnut and this is most likely oral allergy.    Again, thank you for allowing me to participate in the care of your patient.      Sincerely,    Michael Keys MD

## 2018-08-27 NOTE — PATIENT INSTRUCTIONS
She is positive to finned fish and needs to avoid. Oyster is negative so mollusks should be OK, she also tolerates shellfish.  She has an epi device.  We also accessed to pollens and things that cross react in oral allergy syndrome.  She did show a very mild positive to hazelnut and this is most likely oral allergy.                        Oral Allergy Syndrome (Pollen-Food Allergy Syndrome)    The oral allergy syndrome (OAS) is a relatively common form of food allergy, particularly in adults. It occurs in people who have pollen allergy, although not all patients have obvious hay-fever or seasonal allergy symptoms. Patients typically report itching and/or mild swelling of the mouth and throat immediately following ingestion of certain uncooked fruits (including nuts) or raw vegetables. The symptoms result from contact urticaria in the oropharynx caused by pollen-related proteins in these foods. Only a small proportion of affected individuals experience systemic allergic reactions, although the disorder must be differentiated from more serious forms of food allergy.     The prevalence of pollen-food allergy syndrome (PFAS) in the [facility] has not been reported, although it is probably the most common food allergy in adult subjects and there is a general impression that it has become more prevalent as respiratory allergy to pollen has increased over the past two decades.     CLINICAL MANIFESTATIONS--In most patients, symptoms are limited to the oropharynx. However, 2 to 10 percent may experience systemic symptoms, and patients with concomitant atopic dermatitis may notice a worsening of their cutaneous symptoms.    Oropharyngeal symptoms--The most common signs and symptoms are pruritus, tingling, mild erythema, and subtle angioedema of the lips, oral mucosa, palate, and throat; sometimes accompanied by a sensation of throat swelling. Symptoms occur while or shortly after (within 5-10 minutes of) ingesting the  culprit fruit, nut or vegetable. Oral papules or blisters are occasionally reported, although blisters or vesicles in isolation are not typical. Symptoms resolve promptly when the food is swallowed due to disruption of the structure of the allergen by gastric acid and proteolytic digestive enzymes. Data indicates about 1-2% of people will develop severe reactions to these foods so if symptoms worsen it is advised to stop eating the offending food.     Particularly with fruits, patients may report that one variety of the food is more troublesome than another, or that the symptoms do not develop after every exposure.     Seasonal variation--PFAS is an immunoglobulin E (IgE)-mediated reaction, and symptoms may increase during or following the pollen season because of seasonal boosting of pollen IgE levels.    Impact of cooking--In most cases, symptoms only develop in response to eating the raw, uncooked food. Some patients react predominantly to the peel of the raw fruit or vegetable and tolerate pulp. Patients usually tolerate the culprit food in various cooked forms. Cooking, baking, or even briefly microwaving raw fruits and vegetables is usually sufficient to alter the allergens that are responsible for PFAS. Tree nuts and peanuts are an important exception to this generalization, as roasted nuts can cause PFAS.    Caution with anti-ulcer treatments/GERD treatments--Anti-ulcer drugs increase gastric pH and may impair digestion of food proteins.     WHAT CAN I DO ABOUT THIS?: Some studies indicate there may be a benefit when doing allergy shots to the offending pollen however data is not strong. One study looking at slowly increasing the amount eaten over months and then regularly ingesting the food decreased overall symptoms. However, this way only one study of 40 individuals. Taking antihistamines such as Benadryl, Claritin or Zyrtec should also provide some benefit. If the reactions are severe seek medical care  immediately and it is advised at that point to carry an epinephrine device. As stated earlier the risk of severe reactions are low.        *All information has been reviewed, updated and approved by:  Dr. [facility] updated: 11/2016

## 2018-08-27 NOTE — MR AVS SNAPSHOT
After Visit Summary   8/27/2018    Seema Henao    MRN: [id number]           Patient Information     Date Of Birth          1995        Visit Information        Provider Department      8/27/2018 2:15 PM Michael Fields MD [facility]        Today's Diagnoses     Allergic reaction, subsequent encounter          Care Instructions     She is positive to finned fish and needs to avoid. Oyster is negative so mollusks should be OK, she also tolerates shellfish.  She has an epi device.  We also accessed to pollens and things that cross react in oral allergy syndrome.  She did show a very mild positive to hazelnut and this is most likely oral allergy.                        Oral Allergy Syndrome (Pollen-Food Allergy Syndrome)    The oral allergy syndrome (OAS) is a relatively common form of food allergy, particularly in adults. It occurs in people who have pollen allergy, although not all patients have obvious hay-fever or seasonal allergy symptoms. Patients typically report itching and/or mild swelling of the mouth and throat immediately following ingestion of certain uncooked fruits (including nuts) or raw vegetables. The symptoms result from contact urticaria in the oropharynx caused by pollen-related proteins in these foods. Only a small proportion of affected individuals experience systemic allergic reactions, although the disorder must be differentiated from more serious forms of food allergy.     The prevalence of pollen-food allergy syndrome (PFAS) in the [facility] has not been reported, although it is probably the most common food allergy in adult subjects and there is a general impression that it has become more prevalent as respiratory allergy to pollen has increased over the past two decades.     CLINICAL MANIFESTATIONS--In most patients, symptoms are limited to the oropharynx. However, 2 to 10 percent may experience systemic symptoms, and  patients with concomitant atopic dermatitis may notice a worsening of their cutaneous symptoms.    Oropharyngeal symptoms--The most common signs and symptoms are pruritus, tingling, mild erythema, and subtle angioedema of the lips, oral mucosa, palate, and throat; sometimes accompanied by a sensation of throat swelling. Symptoms occur while or shortly after (within 5-10 minutes of) ingesting the culprit fruit, nut or vegetable. Oral papules or blisters are occasionally reported, although blisters or vesicles in isolation are not typical. Symptoms resolve promptly when the food is swallowed due to disruption of the structure of the allergen by gastric acid and proteolytic digestive enzymes. Data indicates about 1-2% of people will develop severe reactions to these foods so if symptoms worsen it is advised to stop eating the offending food.     Particularly with fruits, patients may report that one variety of the food is more troublesome than another, or that the symptoms do not develop after every exposure.     Seasonal variation--PFAS is an immunoglobulin E (IgE)-mediated reaction, and symptoms may increase during or following the pollen season because of seasonal boosting of pollen IgE levels.    Impact of cooking--In most cases, symptoms only develop in response to eating the raw, uncooked food. Some patients react predominantly to the peel of the raw fruit or vegetable and tolerate pulp. Patients usually tolerate the culprit food in various cooked forms. Cooking, baking, or even briefly microwaving raw fruits and vegetables is usually sufficient to alter the allergens that are responsible for PFAS. Tree nuts and peanuts are an important exception to this generalization, as roasted nuts can cause PFAS.    Caution with anti-ulcer treatments/GERD treatments--Anti-ulcer drugs increase gastric pH and may impair digestion of food proteins.     WHAT CAN I DO ABOUT THIS?: Some studies indicate there may be a benefit when  "doing allergy shots to the offending pollen however data is not strong. One study looking at slowly increasing the amount eaten over months and then regularly ingesting the food decreased overall symptoms. However, this way only one study of 40 individuals. Taking antihistamines such as Benadryl, Claritin or Zyrtec should also provide some benefit. If the reactions are severe seek medical care immediately and it is advised at that point to carry an epinephrine device. As stated earlier the risk of severe reactions are low.        *All information has been reviewed, updated and approved by:  Dr. [facility] updated: 11/2016                                      Follow-ups after your visit        Who to contact     If you have questions or need follow up information about today's clinic visit or your schedule please contact [facility] directly at [phone].  Normal or non-critical lab and imaging results will be communicated to you by MyChart, letter or phone within 4 business days after the clinic has received the results. If you do not hear from us within 7 days, please contact the clinic through [employer] or phone. If you have a critical or abnormal lab result, we will notify you by phone as soon as possible.  Submit refill requests through [employer] or call your pharmacy and they will forward the refill request to us. Please allow 3 business days for your refill to be completed.          Additional Information About Your Visit        [employer] Information     [employer] lets you send messages to your doctor, view your test results, renew your prescriptions, schedule appointments and more. To sign up, go to [email or web address] . Click on \"Log in\" on the left side of the screen, which will take you to the Welcome page. Then click on \"Sign up Now\" on the right side of the page.     You will be asked to enter the access code listed below, " "as well as some personal information. Please follow the directions to create your username and password.     Your access code is: [email or web address]  Expires: 2018  3:15 PM     Your access code will  in 90 days. If you need help or a new code, please call your [place] clinic or [phone].        Care EveryWhere ID     This is your Care EveryWhere ID. This could be used by other organizations to access your [place] medical records  [id number]        Your Vitals Were     Pulse Respirations Height Pulse Oximetry BMI (Body Mass Index)       85 16 1.676 m (5' 6\") 98% 22.6 kg/m2        Blood Pressure from Last 3 Encounters:   18 104/70   18 108/69   18 103/73    Weight from Last 3 Encounters:   18 63.5 kg (140 lb)   18 65.8 kg (145 lb 1.6 oz)              Today, you had the following     No orders found for display       Primary Care Provider Fax #    Brent Polatis [facility] [phone]       [address] [place] [id number]        Equal Access to Services     [facility]: Hadii aad ku hadasho Sooliver, waaxda luqadaha, qaybta kaalmada ademadisonyada, jazmín dotson . So [facility] [phone].    ATENCIÓN: Si habla español, tiene a bolton disposición servicios gratuitos de asistencia lingüística. Llame al [phone].    We comply with applicable federal civil rights laws and [place] laws. We do not discriminate on the basis of race, color, national origin, age, disability, sex, sexual orientation, or gender identity.            Thank you!     Thank you for choosing [facility]  for your care. Our goal is always to provide you with excellent care. Hearing back from our patients is one way we can continue to improve our services. Please take a few minutes to complete the written survey that you may receive in the mail after your visit with us. Thank you!             Your Updated Medication List - Protect others around you: " Learn how to safely use, store and throw away your medicines at www.disposemymeds.org.          This list is accurate as of 8/27/18  3:15 PM.  Always use your most recent med list.                   Brand Name Dispense Instructions for use Diagnosis    EPINEPHrine 0.3 MG/0.3ML injection 2-pack    EPIPEN/ADRENACLICK/or ANY BX GENERIC EQUIV    0.6 mL    Use 1 epi pen for lip swelling, difficulty breathing, or wheezing.    Allergic reaction, subsequent encounter       SERTRALINE HCL PO      Take 75 mg by mouth daily        XOPENEX IN      Inhale into the lungs as needed